# Patient Record
(demographics unavailable — no encounter records)

---

## 2018-11-27 NOTE — XRAY REPORT
Reason:  injury, pain

Procedure Date:  11/27/2018   

Accession Number:  [id number] / [id number]                    

Procedure:  XR  - Foot 3 View RT CPT Code:  

 

FULL RESULT:

 

 

EXAM:

RIGHT FOOT RADIOGRAPHY

 

EXAM DATE: 11/27/2018 01:37 AM.

 

CLINICAL HISTORY: Injury, pain.

 

COMPARISON: None.

 

TECHNIQUE: 3 views.

 

FINDINGS:

Bones: Normal. No fractures or bone lesions.

 

Joints: Normal. No subluxations.

 

Soft Tissues: Normal. No soft tissue swelling.

IMPRESSION: Normal foot radiography.

 

[facility]

## 2018-11-27 NOTE — XRAY REPORT
Reason:  injury, pain, tenderness

Procedure Date:  11/27/2018   

Accession Number:  [id number] / [id number]                    

Procedure:  XR  - Ankle 3 View RT CPT Code:  

 

FULL RESULT:

 

 

EXAM:

RIGHT ANKLE RADIOGRAPHY

 

EXAM DATE: 11/27/2018 01:35 AM.

 

CLINICAL HISTORY: Injury, pain, tenderness.

 

COMPARISON: None.

 

TECHNIQUE: 3 views.

 

FINDINGS:

Bones: Normal. No fractures or bone lesions. There are a few small 

ossicles inferior to the medial malleolus.

 

Joints: Normal. No effusion. No subluxations. The ankle mortise is 

normally aligned.

 

Soft Tissues: Normal. No soft tissue swelling.

IMPRESSION: Negative x-rays. No acute findings.

 

[facility]

## 2018-11-27 NOTE — ED PHYSICIAN DOCUMENTATION
PD HPI LOWER EXT INJURY





- Stated complaint


Stated Complaint: RT ANKLE PAIN





- Chief complaint


Chief Complaint: Ext Problem





- History obtained from


History obtained from: Patient





- History of Present Illness


PD HPI LOW EXT INJURY LOCATION: Right, Ankle


Type of injury: Other (no injury (was walking))


Timing - onset: Enter  time (19:00), Today


Timing - details: Abrupt onset


Pain level now: 6


Worsened by: Moving, Palpating


Recently seen: Not recently seen





- Additional information


Additional information: 





7 PM tonight, patient had sudden onset right ankle pain while walking (she was 

standing and opening a door). she says she has had this before, and she says it 

was due to her ankle "dislocating" (per patient). she says she is to have 

surgery for this recurrent problem. She c/o right ankle and right hip pain. The 

ankle pain is worse with palpation and movement. she is able to weight-bear, 

although this also exacerbates the pain.





Review of Systems


Musculoskeletal: reports: Joint pain, Pain with weight bearing


Neurologic: denies: Focal weakness, Numbness





PD PAST MEDICAL HISTORY





- Past Medical History


Past Medical History: Yes


Cardiovascular: None


Respiratory: None


Neuro: None


Endocrine/Autoimmune: None


GI: None


GYN: None


: None


HEENT: None


Psych: None


Musculoskeletal: Other


Derm: None


Other Past Medical History: LATERAL R ANKLE UNSTABLE (surgery 1/18/2019 rey bhatti)...





- Past Surgical History


Past Surgical History: No





- Present Medications


Home Medications: 


                                Ambulatory Orders











 Medication  Instructions  Recorded  Confirmed


 


Ibuprofen 600 mg PO Q6HR PRN #30 tablet 01/17/16 


 


predniSONE [Deltasone] 40 mg PO DAILY 5 Days  tablet 01/17/16 


 


traMADol [Ultram] 50 mg PO Q4-6H PRN #15 tablet 01/17/16 


 


Tramadol HCl 50 mg PO Q6HR PRN #20 tablet 11/27/18 














- Allergies


Allergies/Adverse Reactions: 


                                    Allergies











Allergy/AdvReac Type Severity Reaction Status Date / Time


 


clarithromycin [From Biaxin] Allergy  Unknown Verified 11/27/18 00:34


 


Penicillins Allergy  Unknown Verified 11/27/18 00:34














- Social History


Does the pt smoke?: No


Smoking Status: Never smoker


Does the pt drink ETOH?: No


Does the pt have substance abuse?: No





- Immunizations


Immunizations are current?: Yes





- POLST


Patient has POLST: No





PD ED PE NORMAL





- Vitals


Vital signs reviewed: Yes





- General


General: Alert and oriented X 3, No acute distress (NAD at rest; pain is 

reproduced with exam), Well developed/nourished





- Derm


Derm: Normal color, Warm and dry





- Extremities


Extremities: No deformity, No edema, Other (TTP right ankle, medial and lateral 

malleoli. TTP right foot, base of fifth metatarsal. )





- Neuro


Neuro: No motor deficit, No sensory deficit





Results





- Vitals


Vitals: 


                               Vital Signs - 24 hr











  11/27/18 11/27/18 11/27/18





  00:31 01:36 01:57


 


Temperature 36.7 C  


 


Heart Rate 81  


 


Respiratory 17 17 16





Rate   


 


Blood Pressure 144/90 H  


 


O2 Saturation 99  














  11/27/18 11/27/18





  02:55 03:00


 


Temperature  


 


Heart Rate 62 


 


Respiratory 17 17





Rate  


 


Blood Pressure 138/85 H 


 


O2 Saturation 99 








                                     Oxygen











O2 Source                      Room air

















- Rads (name of study)


  ** right ankle xrays


Radiology: Prelim report reviewed, See rad report





  ** right foot xrays


Radiology: Prelim report reviewed, See rad report





PD MEDICAL DECISION MAKING





- ED course


Complexity details: reviewed results, re-evaluated patient, considered diff

erential, d/w patient





Departure





- Departure


Disposition: 01 Home, Self Care


Clinical Impression: 


 Right ankle sprain





Condition: Good


Instructions:  ED Sprain Ankle W X Ray


Follow-Up: 


AYDIN GAN III, MD [Primary Care Provider] - 


Prescriptions: 


Tramadol HCl 50 mg PO Q6HR PRN #20 tablet


 PRN Reason: Pain


Forms:  Activity restrictions


Discharge Date/Time: 11/27/18 03:00